# Patient Record
Sex: MALE | Race: WHITE
[De-identification: names, ages, dates, MRNs, and addresses within clinical notes are randomized per-mention and may not be internally consistent; named-entity substitution may affect disease eponyms.]

---

## 2020-09-21 NOTE — NUR
Ambulatory in Day Surgery
Surgical site prepped with 2% Chlorhexidine cloth wipe.
History, Chart, Medications and Allergies reviewed before start of
procedure.Lungs clear T/O to Auscultation.
Patient confirms NPO status and agrees with scheduled surgery.
Patient reports completing Chlorhexadine shower X2 prior to admission to
hospital. ALL BELONINGS PLACED UNDER BED IN RED AND BLACK GYM BAG AND
BELONINGS BAG.

## 2020-09-22 NOTE — NUR
DISCHARGE
CLEARED THERAPY, PAIN MANAGED, SEEN BY DR HOLLAND, ALVINA, JEAN PIERRE, AND POLAR
PACK GIVEN. ESCORTED OUT VIA W/C.

## 2020-09-22 NOTE — NUR
SHIFT SUMMARY
POD#1. AAOX4. DISCOMFORT CONTROLLED WITH SCHEDULED TORADOL/TYLENOL + 1
ROXICODONE Q4H. NO NAUSEA/EMESIS. DRESSING TO RIGHT KNEE C/D/I WITH CRYO ICE
PACK IN PLACE T/O NIGHT. PT RESTING WELL THIS AM. PT REFUSING TO GET OOB
YESTARDAY EVENING R/T REPORTED GENERALIZED WEAKNESS, WILL ENCOURAGE AMBULATION
THIS AM + OOB TO CHAIR BEFORE BREAKFAST. PT CURRENTLY RESTING WELL IN BED WITH
CALL LIGHT IN REACH.

## 2023-02-22 ENCOUNTER — HOSPITAL ENCOUNTER (OUTPATIENT)
Dept: HOSPITAL 95 - ORSCMMR | Age: 68
Discharge: HOME | End: 2023-02-22
Attending: SURGERY
Payer: MEDICARE

## 2023-02-22 VITALS — HEIGHT: 68 IN | WEIGHT: 160.72 LBS | BODY MASS INDEX: 24.36 KG/M2

## 2023-02-22 DIAGNOSIS — K40.90: Primary | ICD-10-CM

## 2023-02-22 DIAGNOSIS — I10: ICD-10-CM

## 2023-02-22 DIAGNOSIS — Z79.899: ICD-10-CM

## 2023-02-22 DIAGNOSIS — D68.00: ICD-10-CM

## 2023-02-22 DIAGNOSIS — Z87.891: ICD-10-CM

## 2023-02-22 PROCEDURE — A9270 NON-COVERED ITEM OR SERVICE: HCPCS

## 2023-02-22 PROCEDURE — C1781 MESH (IMPLANTABLE): HCPCS

## 2023-02-22 PROCEDURE — 0YU60JZ SUPPLEMENT LEFT INGUINAL REGION WITH SYNTHETIC SUBSTITUTE, OPEN APPROACH: ICD-10-PCS | Performed by: SURGERY

## 2023-02-22 PROCEDURE — 3E0M05Z INTRODUCTION OF ADHESION BARRIER INTO PERITONEAL CAVITY, OPEN APPROACH: ICD-10-PCS | Performed by: SURGERY

## 2023-02-22 NOTE — NUR
Ambulatory in Day Surgery
History, Chart, Medications and Allergies reviewed before start of
procedure.
Pre-Op teaching done. Pt verbalizes understanding.
Patient States Post-Procedure ride home has been arranged.

## 2023-02-22 NOTE — NUR
REPORT FROM TAMMY LICONA RN. PT REQUESTED AND TOLERATING PO FLUIDS AND FOOD.
PT HAS ONE INCISION SITE COVERED WITH 4 INCHES LENGTH GAUZE AND CLEAR OPSITE
THAT IS CDI. NO SWELLING, INFLAMMATION, DRAINAGE NOTED.

## 2023-02-22 NOTE — NUR
Patient up to Ambulate independently. Gait steady.
Discharge instructions reviewed with patient. Patient verbalizes understanding.
Copy given to patient to take home.
Dressing to procedure site clean, dry, intact with no visible
drainage, swelling, erythema or bruising noted.
Patient States Post-Procedure ride home has been arranged.
Discharged via wheelchair to private car for ride home. ALL BELONGINGS
RETURNED TO PATIENT.